# Patient Record
Sex: FEMALE | Race: WHITE | NOT HISPANIC OR LATINO | Employment: PART TIME | URBAN - METROPOLITAN AREA
[De-identification: names, ages, dates, MRNs, and addresses within clinical notes are randomized per-mention and may not be internally consistent; named-entity substitution may affect disease eponyms.]

---

## 2017-04-03 ENCOUNTER — TRANSCRIBE ORDERS (OUTPATIENT)
Dept: ADMINISTRATIVE | Age: 22
End: 2017-04-03

## 2017-04-03 ENCOUNTER — APPOINTMENT (OUTPATIENT)
Dept: LAB | Age: 22
End: 2017-04-03
Attending: PREVENTIVE MEDICINE
Payer: COMMERCIAL

## 2017-04-03 DIAGNOSIS — Z02.1 PRE-EMPLOYMENT HEALTH SCREENING EXAMINATION: ICD-10-CM

## 2017-04-03 DIAGNOSIS — Z02.1 PRE-EMPLOYMENT HEALTH SCREENING EXAMINATION: Primary | ICD-10-CM

## 2017-04-03 PROCEDURE — 86480 TB TEST CELL IMMUN MEASURE: CPT

## 2017-04-03 PROCEDURE — 36415 COLL VENOUS BLD VENIPUNCTURE: CPT

## 2017-04-11 LAB — QUANTIFERON-TB GOLD IN TUBE: NORMAL

## 2018-11-29 ENCOUNTER — OFFICE VISIT (OUTPATIENT)
Dept: CARDIOLOGY CLINIC | Facility: CLINIC | Age: 23
End: 2018-11-29
Payer: COMMERCIAL

## 2018-11-29 ENCOUNTER — TELEPHONE (OUTPATIENT)
Dept: NON INVASIVE DIAGNOSTICS | Facility: HOSPITAL | Age: 23
End: 2018-11-29

## 2018-11-29 VITALS
SYSTOLIC BLOOD PRESSURE: 118 MMHG | HEIGHT: 66 IN | BODY MASS INDEX: 16.99 KG/M2 | DIASTOLIC BLOOD PRESSURE: 72 MMHG | WEIGHT: 105.7 LBS | HEART RATE: 101 BPM

## 2018-11-29 DIAGNOSIS — R00.0 TACHYCARDIA: ICD-10-CM

## 2018-11-29 DIAGNOSIS — G90.9 AUTONOMIC DYSFUNCTION: ICD-10-CM

## 2018-11-29 DIAGNOSIS — R56.9 SEIZURE (HCC): ICD-10-CM

## 2018-11-29 DIAGNOSIS — R55 SYNCOPE, UNSPECIFIED SYNCOPE TYPE: Primary | ICD-10-CM

## 2018-11-29 PROCEDURE — 99245 OFF/OP CONSLTJ NEW/EST HI 55: CPT | Performed by: INTERNAL MEDICINE

## 2018-11-29 PROCEDURE — 93000 ELECTROCARDIOGRAM COMPLETE: CPT | Performed by: INTERNAL MEDICINE

## 2018-11-29 RX ORDER — FOLIC ACID 1 MG/1
2 TABLET ORAL
COMMUNITY
Start: 2018-01-02 | End: 2018-11-29 | Stop reason: ALTCHOICE

## 2018-11-29 RX ORDER — LORAZEPAM 0.5 MG/1
0.5 TABLET ORAL AS NEEDED
COMMUNITY
Start: 2018-11-20

## 2018-11-29 RX ORDER — NORETHINDRONE ACETATE AND ETHINYL ESTRADIOL 1MG-20(21)
1 KIT ORAL
COMMUNITY
Start: 2017-12-26 | End: 2018-11-29 | Stop reason: ALTCHOICE

## 2018-11-29 RX ORDER — NEBIVOLOL 5 MG/1
5 TABLET ORAL
COMMUNITY
End: 2018-12-02

## 2018-11-29 RX ORDER — AZELASTINE HYDROCHLORIDE 137 UG/1
SPRAY, METERED NASAL
COMMUNITY
Start: 2018-08-30 | End: 2018-11-29 | Stop reason: ALTCHOICE

## 2018-11-29 RX ORDER — ALBUTEROL SULFATE 90 UG/1
AEROSOL, METERED RESPIRATORY (INHALATION)
COMMUNITY
Start: 2016-02-26 | End: 2018-11-29 | Stop reason: ALTCHOICE

## 2018-11-29 NOTE — LETTER
December 2, 2018     MD Halina [address]  [address]  [facility] [address]    Patient: Gunner Reeves   YOB: 1995   Date of Visit: 11/29/2018       Dear Dr Mita Buitrago: Thank you for referring Carmela Arango to me for evaluation  Below are my notes for this consultation  If you have questions, please do not hesitate to call me  I look forward to following your patient along with you  Sincerely,        Fauzia Rizvi MD        CC: MD Fauzia Rushing MD  12/2/2018  3:08 PM  Sign at close encounter                                             Cardiology Consultation     Gunner Reeves  [id number]  1995  [address]    1  Syncope, unspecified syncope type  POCT ECG    Tilt table   2  Tachycardia  Tilt table    propranolol (INDERAL) 10 mg tablet   3  Seizure ([facility] [address] )     4   Autonomic dysfunction         History of present illness    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 referred to me by Dr Rl Cartwright for management of  Tachycardia, pre syncope and syncope     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  She is a runner and can runner at 4 6 m/h for 45 min on many days  She is physically active    She has passed out - standing for a prolonged period at rounds, after she has stopped running and cooling down, with orthostatic position change   She does have a history of seizures  She also has anorexia and is working on same , BMI -17    She however informed that she is here for  discussions about a LINQ  She was under impression that it will be in for a month,  and will help solve diagnose symptoms going on for last 6-7 years     She has been on metoprolol and recently on bisoprolol  These have not really helped her   She drinks about 30 oz of water in a day    The patient is not complaining of anginal like chest pain or chest pressure  There is no worsening orthopnea, paroxysmal nocturnal dyspnea  There is no leg swelling    There is history of presyncope or syncope as described above  There is rare history of transient  lightheadedness  When patient has these palpitations, it is associated with exertional intolerance      Patient Active Problem List   Diagnosis    Seizure-like activity ([facility] )    Seizure ([facility] )    Tachycardia    Autonomic dysfunction     Past Medical History:   Diagnosis Date    Seizures ([facility] )      Social History     Social History    Marital status: Single     Spouse name: N/A    Number of children: N/A    Years of education: N/A     Occupational History    Not on file  Social History Main Topics    Smoking status: Never Smoker    Smokeless tobacco: Never Used    Alcohol use Yes    Drug use: Unknown      Comment: unknown    Sexual activity: Not on file     Other Topics Concern    Not on file     Social History Narrative    No narrative on file      History reviewed  No pertinent family history  History reviewed  No pertinent surgical history      Current Outpatient Prescriptions:     LEVETIRACETAM ER PO, Take 1,500 mg by mouth 2 (two) times a day  , Disp: , Rfl:     LORazepam (ATIVAN) 0 5 mg tablet, Take 0 5 mg by mouth as needed  , Disp: , Rfl:     nebivolol (BYSTOLIC) 5 mg tablet, Take 5 mg by mouth Takes 10mg in the morning and 5mg in the evening , Disp: , Rfl:   Allergies   Allergen Reactions    Morphine Anaphylaxis     Stopped breathing (too high a dose suspected)    Iodinated Diagnostic Agents Hives and Rash     Vitals:    11/29/18 0853   BP: 118/72   BP Location: Left arm   Patient Position: Sitting   Cuff Size: Adult   Pulse: 101   Weight: 47 9 kg (105 lb 11 2 oz)   Height: 5' 6" (1 676 m)       Labs:  Lab Results   Component Value Date     08/22/2014    K 3 6 09/25/2016    K 4 2 08/22/2014     (H) 09/25/2016     08/22/2014    CO2 23 09/25/2016    CO2 23 09/25/2016    BUN 7 09/25/2016    BUN 11 08/22/2014    CREATININE 0 60 09/25/2016    CREATININE 0 54 (L) 08/22/2014    GLUCOSE 84 09/25/2016    GLUCOSE 67 08/22/2014    CALCIUM 7 8 (L) 09/25/2016    CALCIUM 9 5 08/22/2014     Lab Results   Component Value Date    CKTOTAL 112 09/25/2016     Lab Results   Component Value Date    WBC 3 07 (L) 09/25/2016    WBC 5 74 08/22/2014    HGB 11 8 09/25/2016    HGB 12 7 08/22/2014    HCT 35 7 09/25/2016    HCT 38 2 08/22/2014    MCV 86 09/25/2016    MCV 86 08/22/2014     09/25/2016     08/22/2014     No results found for: CHOL, TRIG, HDL, LDLDIRECT  Imaging: No results found  Review of Systems:  Review of Systems   All other systems reviewed and are negative  as described in my history of present illness        Physical Exam:  Physical Exam   Constitutional: She is oriented to person, place, and time  No distress  Not in any distress at the current time  Thinly built   HENT:   Head: Normocephalic and atraumatic  Right Ear: External ear normal    Left Ear: External ear normal    Nose: Nose normal    Mouth/Throat: Uvula is midline and mucous membranes are normal    Eyes: Pupils are equal, round, and reactive to light  Conjunctivae, EOM and lids are normal  No scleral icterus  No pallor  No cyanosis  No icterus   Neck: Trachea normal and normal range of motion  Neck supple  No JVD present  Carotid bruit is not present  No thyromegaly present  No jugular lymphadenopathy   Cardiovascular: Normal rate, regular rhythm, S1 normal, S2 normal, normal heart sounds, intact distal pulses and normal pulses  PMI is not displaced  Exam reveals no gallop, no S3, no S4 and no friction rub  No murmur heard  Pulmonary/Chest: Effort normal and breath sounds normal  No accessory muscle usage  No respiratory distress  She has no decreased breath sounds  She has no wheezes  She has no rhonchi  She has no rales  She exhibits no tenderness  Abdominal: Soft   Normal appearance and bowel sounds are normal  She exhibits no distension and no mass  There is no splenomegaly or hepatomegaly  There is no tenderness  Musculoskeletal: Normal range of motion  She exhibits no edema, tenderness or deformity  Lymphadenopathy:     She has no cervical adenopathy  Neurological: She is alert and oriented to person, place, and time  Facial symmetry is retained  Extraocular movements are retained  Head neck tongue and palate movement are retained and symmetric   Skin: Skin is intact  No abrasion, no lesion and no rash noted  No erythema  Nails show no clubbing  Psychiatric: She has a normal mood and affect  Her speech is normal and behavior is normal  Thought content normal        Discussion/Summary:    1   Autonomic dysfunction  Palpitation, pre syncope, syncope     Patient does have symptoms involving multiple systems:  -Palpitations, lightheadedness, syncope on postural changes  -Headaches  -Fatigue and exertional intolerance at times      Important relevant laboratory parameters:  CBCD- normal  TSH- normal - checked outside   Holter - has circadian variation - not IST  Echo - normal  TILT - Not available         Considering her multisystem symptoms, laboratory parameters - suspected diagnosis is  Autonomic dysfunction    The following are my detailed recommendations:  Exercise is the most important component of therapy in functional autonomic disorders     1)  Water intake  Oral 2 1/2 to 3 L a day  This is close to 75 - 90 ounce  Is recommended measure      Intravenous saline should only be used as emergency step to prevent hospital admissions  Done in the emergency room, up to a liter to 1 liters over 1 hour  Symptomatic improvement can last several hours to days  Should be used only as rescue therapy in a clinically decompensated patient  Long-term infusion is never recommended      2) Salt intake  upto 10 grams can be used in appropriate patient   Considering her age, risk of hypertension recommend -half the water be gatorade /powerade      3) Pressure stockings  Bilateral  Knee high, thigh high or waist high  10 mm, 20 mm, 30 mm, 40 mm Hg  Helps with venous return        4) Exercise -  [place] protocol  Set up appointment with Ms Damon Palencia / Ms Rona Clemente  This has both aerobic and muscle training component  Aerobic- will increase vagal tone and control of heart rate  Muscle training of legs- will help with venous return   Will request monthly evaluation of level of exercise tolerance       5) Medications to avoid  Numerous anti depressants and ADHD medications that inhibit NE transporter  TCA  SNRI - duloxetine, venlafaxine, milnacipram  NRI - atomoxetine, reboxetine  These can provoke POTS in susceptible healthy volunteers        6) beta blockers  after exercise has been started, if patient still symptomatic with palpitations  Consider low-dose nonselective beta blocker  Propranolol 10 mg up to 3 times daily  Do not increase dose as it may worsen asthma  Also with diabetes should avoid any high dose of beta blocker  Avoid using selective beta blockers like metoprolol        7) Fludrocortisone  This will help with water retention  The [place] affect lasts only 1-2 days  Effectiveness not really tested with randomized clinical trial      8)Midodrine  This is an alpha agonist, constriction of veins and arteries  It increases venous return and reduces orthostatic tachycardia  Effect is less than intravenous line  Rapid onset but only brief effect  It needs to be taken 3 times daily  Can be administered only during a time to avoid supine hypertension      9) Pyridostigmine  Is a peripheral acetylcholine esterase inhibitor  Can be used as an additive to other medication  Has significant side effects of diarrhea, abdominal pain, nausea, increased urinary urgency and frequency      10) Ivabradine  About 60 percent the patient's of symptomatic improvement in open-label  study 11) other medications-central sympathetic light occasions  Clonidine, methyldopa  Both agents can cause drowsiness, fatigue, worsening mental clouding      12) Modafinil  Has been considered for fatigue and cognitive dysfunction  It worsen symptom of tachycardia          Summary of my recommendations   At the current time my recommendations are   - TILT table study, comprehensive 45 min tilt - can help with diagnosis of vasovagal syncope vs POTS  - water intake + pressure stockings + exercise  - d/c bisoprolol  - start propranolol 10 mg tid  - medications to avoid as in 5  - other medications are not to be considered at this time   - follow up in 2-3 months

## 2018-12-02 PROBLEM — G90.9 AUTONOMIC DYSFUNCTION: Status: ACTIVE | Noted: 2018-12-02

## 2018-12-02 RX ORDER — PROPRANOLOL HYDROCHLORIDE 10 MG/1
10 TABLET ORAL 3 TIMES DAILY
Qty: 90 TABLET | Refills: 5 | OUTPATIENT
Start: 2018-12-02

## 2018-12-02 NOTE — PROGRESS NOTES
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 Dr Neel Taylor for management of  Tachycardia, pre syncope and syncope     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  She is a runner and can runner at 4 6 m/h for 45 min on many days  She is physically active    She has passed out - standing for a prolonged period at rounds, after she has stopped running and cooling down, with orthostatic position change   She does have a history of seizures  She also has anorexia and is working on same , BMI -17    She however informed that she is here for  discussions about a LINQ  She was under impression that it will be in for a month,  and will help solve diagnose symptoms going on for last 6-7 years     She has been on metoprolol and recently on bisoprolol  These have not really helped her   She drinks about 30 oz of water in a day    The patient is not complaining of anginal like chest pain or chest pressure  There is no worsening orthopnea, paroxysmal nocturnal dyspnea  There is no leg swelling    There is history of presyncope or syncope as described above  There is rare history of transient  lightheadedness  When patient has these palpitations, it is associated with exertional intolerance      Patient Active Problem List   Diagnosis    Seizure-like activity ([facility] )    Seizure ([facility] )    Tachycardia    Autonomic dysfunction     Past Medical History:   Diagnosis Date    [facility]ures ([facility] )      Social History     Social History    Marital status: Single Spouse name: N/A    Number of children: N/A    Years of education: N/A     Occupational History    Not on file  Social History Main Topics    Smoking status: Never Smoker    Smokeless tobacco: Never Used    Alcohol use Yes    Drug use: Unknown      Comment: unknown    Sexual activity: Not on file     Other Topics Concern    Not on file     Social History Narrative    No narrative on file      History reviewed  No pertinent family history  History reviewed  No pertinent surgical history      Current Outpatient Prescriptions:     LEVETIRACETAM ER PO, Take 1,500 mg by mouth 2 (two) times a day  , Disp: , Rfl:     LORazepam (ATIVAN) 0 5 mg tablet, Take 0 5 mg by mouth as needed  , Disp: , Rfl:     nebivolol (BYSTOLIC) 5 mg tablet, Take 5 mg by mouth Takes 10mg in the morning and 5mg in the evening , Disp: , Rfl:   Allergies   Allergen Reactions    Morphine Anaphylaxis     Stopped breathing (too high a dose suspected)    Iodinated Diagnostic Agents Hives and Rash     Vitals:    11/29/18 0853   BP: 118/72   BP Location: Left arm   Patient Position: Sitting   Cuff Size: Adult   Pulse: 101   Weight: 47 9 kg (105 lb 11 2 oz)   Height: 5' 6" (1 676 m)       Labs:  Lab Results   Component Value Date     08/22/2014    K 3 6 09/25/2016    K 4 2 08/22/2014     (H) 09/25/2016     08/22/2014    CO2 23 09/25/2016    CO2 23 09/25/2016    BUN 7 09/25/2016    BUN 11 08/22/2014    CREATININE 0 60 09/25/2016    CREATININE 0 54 (L) 08/22/2014    GLUCOSE 84 09/25/2016    GLUCOSE 67 08/22/2014    CALCIUM 7 8 (L) 09/25/2016    CALCIUM 9 5 08/22/2014     Lab Results   Component Value Date    CKTOTAL 112 09/25/2016     Lab Results   Component Value Date    WBC 3 07 (L) 09/25/2016    WBC 5 74 08/22/2014    HGB 11 8 09/25/2016    HGB 12 7 08/22/2014    HCT 35 7 09/25/2016    HCT 38 2 08/22/2014    MCV 86 09/25/2016    MCV 86 08/22/2014     09/25/2016     08/22/2014     No results found for: CHOL, TRIG, HDL, LDLDIRECT  Imaging: No results found  Review of Systems:  Review of Systems   All other systems reviewed and are negative  as described in my history of present illness        Physical Exam:  Physical Exam   Constitutional: She is oriented to person, place, and time  No distress  Not in any distress at the current time  Thinly built   HENT:   Head: Normocephalic and atraumatic  Right Ear: External ear normal    Left Ear: External ear normal    Nose: Nose normal    Mouth/Throat: Uvula is midline and mucous membranes are normal    Eyes: Pupils are equal, round, and reactive to light  Conjunctivae, EOM and lids are normal  No scleral icterus  No pallor  No cyanosis  No icterus   Neck: Trachea normal and normal range of motion  Neck supple  No JVD present  Carotid bruit is not present  No thyromegaly present  No jugular lymphadenopathy   Cardiovascular: Normal rate, regular rhythm, S1 normal, S2 normal, normal heart sounds, intact distal pulses and normal pulses  PMI is not displaced  Exam reveals no gallop, no S3, no S4 and no friction rub  No murmur heard  Pulmonary/Chest: Effort normal and breath sounds normal  No accessory muscle usage  No respiratory distress  She has no decreased breath sounds  She has no wheezes  She has no rhonchi  She has no rales  She exhibits no tenderness  Abdominal: Soft  Normal appearance and bowel sounds are normal  She exhibits no distension and no mass  There is no splenomegaly or hepatomegaly  There is no tenderness  Musculoskeletal: Normal range of motion  She exhibits no edema, tenderness or deformity  Lymphadenopathy:     She has no cervical adenopathy  Neurological: She is alert and oriented to person, place, and time  Facial symmetry is retained  Extraocular movements are retained  Head neck tongue and palate movement are retained and symmetric   Skin: Skin is intact  No abrasion, no lesion and no rash noted  No erythema   Nails show no clubbing  Psychiatric: She has a normal mood and affect  Her speech is normal and behavior is normal  Thought content normal        Discussion/Summary:    1   Autonomic dysfunction  Palpitation, pre syncope, syncope     Patient does have symptoms involving multiple systems:  -Palpitations, lightheadedness, syncope on postural changes  -Headaches  -Fatigue and exertional intolerance at times      Important relevant laboratory parameters:  CBCD- normal  TSH- normal - checked outside   Holter - has circadian variation - not IST  Echo - normal  TILT - Not available         Considering her multisystem symptoms, laboratory parameters - suspected diagnosis is  Autonomic dysfunction    The following are my detailed recommendations:  Exercise is the most important component of therapy in functional autonomic disorders     1)  Water intake  Oral 2 1/2 to 3 L a day  This is close to 75 - 90 ounce  Is recommended measure      Intravenous saline should only be used as emergency step to prevent hospital admissions  Done in the emergency room, up to a liter to 1 liters over 1 hour  Symptomatic improvement can last several hours to days  Should be used only as rescue therapy in a clinically decompensated patient  Long-term infusion is never recommended      2) Salt intake  upto 10 grams can be used in appropriate patient   Considering her age, risk of hypertension recommend -half the water be gatorade /powerade      3) Pressure stockings  Bilateral  Knee high, thigh high or waist high  10 mm, 20 mm, 30 mm, 40 mm Hg  Helps with venous return        4) Exercise -  [facility] protocol  Set up appointment with Ms Louann Bamberger / Ms Jett Hernandez  This has both aerobic and muscle training component  Aerobic- will increase vagal tone and control of heart rate  Muscle training of legs- will help with venous return   Will request monthly evaluation of level of exercise tolerance       5) Medications to avoid  Numerous anti depressants and ADHD medications that inhibit NE transporter  TCA  SNRI - duloxetine, venlafaxine, milnacipram  NRI - atomoxetine, reboxetine  These can provoke POTS in susceptible healthy volunteers        6) beta blockers  after exercise has been started, if patient still symptomatic with palpitations  Consider low-dose nonselective beta blocker  Propranolol 10 mg up to 3 times daily  Do not increase dose as it may worsen asthma  Also with diabetes should avoid any high dose of beta blocker  Avoid using selective beta blockers like metoprolol        7) Fludrocortisone  This will help with water retention  [employer] [place] affect lasts only 1-2 days  Effectiveness not really tested with randomized clinical trial      8)Midodrine  This is an alpha agonist, constriction of veins and arteries  It increases venous return and reduces orthostatic tachycardia  Effect is less than intravenous line  Rapid onset but only brief effect  It needs to be taken 3 times daily  Can be administered only during a time to avoid supine hypertension      9) Pyridostigmine  Is a peripheral acetylcholine esterase inhibitor  Can be used as an additive to other medication  Has significant side effects of diarrhea, abdominal pain, nausea, increased urinary urgency and frequency      10) Ivabradine  About 60 percent the patient's of symptomatic improvement in open-label  study       11) other medications-central sympathetic light occasions  Clonidine, methyldopa  Both agents can cause drowsiness, fatigue, worsening mental clouding      12) Modafinil  Has been considered for fatigue and cognitive dysfunction  It worsen symptom of tachycardia          Summary of my recommendations   At the current time my recommendations are   - TILT table study, comprehensive 45 min tilt - can help with diagnosis of vasovagal syncope vs POTS  - water intake + pressure stockings + exercise  - d/c bisoprolol  - start propranolol 10 mg tid  - medications to avoid as in 5  - other medications are not to be considered at this time   - follow up in 2-3 months

## 2018-12-12 ENCOUNTER — TELEPHONE (OUTPATIENT)
Dept: NON INVASIVE DIAGNOSTICS | Facility: HOSPITAL | Age: 23
End: 2018-12-12

## 2018-12-12 NOTE — TELEPHONE ENCOUNTER
Left vm for patient on 12/11 in attempt to fulfill request to have TILT procedure moved to today 12/12

## 2018-12-18 ENCOUNTER — HOSPITAL ENCOUNTER (OUTPATIENT)
Dept: NON INVASIVE DIAGNOSTICS | Facility: HOSPITAL | Age: 23
Discharge: HOME | End: 2018-12-18
Attending: INTERNAL MEDICINE
Payer: COMMERCIAL

## 2018-12-18 DIAGNOSIS — R55 SYNCOPE, UNSPECIFIED SYNCOPE TYPE: ICD-10-CM

## 2018-12-18 DIAGNOSIS — R00.0 TACHYCARDIA: ICD-10-CM

## 2018-12-18 PROCEDURE — 93660 TILT TABLE EVALUATION: CPT | Performed by: INTERNAL MEDICINE

## 2018-12-18 PROCEDURE — 93660 TILT TABLE EVALUATION: CPT

## 2018-12-19 ENCOUNTER — TELEPHONE (OUTPATIENT)
Dept: CARDIOLOGY CLINIC | Facility: CLINIC | Age: 23
End: 2018-12-19
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
## 2018-12-23 NOTE — TELEPHONE ENCOUNTER
TILT reviewed - no symptoms during TILT    Patients father called and made aware    Dr Jovan Thompson called and updated

## 2019-05-28 ENCOUNTER — HOSPITAL ENCOUNTER (EMERGENCY)
Facility: HOSPITAL | Age: 24
Discharge: HOME | End: 2019-05-28
Attending: EMERGENCY MEDICINE | Admitting: EMERGENCY MEDICINE
Payer: COMMERCIAL

## 2019-05-28 VITALS
RESPIRATION RATE: 18 BRPM | HEART RATE: 99 BPM | SYSTOLIC BLOOD PRESSURE: 126 MMHG | OXYGEN SATURATION: 100 % | DIASTOLIC BLOOD PRESSURE: 80 MMHG | TEMPERATURE: 98.8 F

## 2019-05-28 DIAGNOSIS — R55 VASOVAGAL SYNCOPE: Primary | ICD-10-CM

## 2019-05-28 LAB
ATRIAL RATE: 76 BPM
BILIRUB UR QL STRIP: NEGATIVE
CLARITY UR: CLEAR
COLOR UR: YELLOW
EXT PREG TEST URINE: NEGATIVE
GLUCOSE SERPL-MCNC: 79 MG/DL (ref 65–140)
GLUCOSE UR STRIP-MCNC: NEGATIVE MG/DL
HGB UR QL STRIP.AUTO: NEGATIVE
KETONES UR STRIP-MCNC: ABNORMAL MG/DL
LEUKOCYTE ESTERASE UR QL STRIP: NEGATIVE
NITRITE UR QL STRIP: NEGATIVE
P AXIS: 70 DEGREES
PH UR STRIP.AUTO: 6 [PH] (ref 4.5–8)
PR INTERVAL: 148 MS
PROT UR STRIP-MCNC: NEGATIVE MG/DL
QRS AXIS: 69 DEGREES
QRSD INTERVAL: 86 MS
QT INTERVAL: 360 MS
QTC INTERVAL: 405 MS
SP GR UR STRIP.AUTO: 1.02 (ref 1–1.03)
T WAVE AXIS: 49 DEGREES
UROBILINOGEN UR QL STRIP.AUTO: 0.2 E.U./DL
VENTRICULAR RATE: 76 BPM

## 2019-05-28 PROCEDURE — 93010 ELECTROCARDIOGRAM REPORT: CPT | Performed by: INTERNAL MEDICINE

## 2019-05-28 PROCEDURE — 99284 EMERGENCY DEPT VISIT MOD MDM: CPT

## 2019-05-28 PROCEDURE — 82948 REAGENT STRIP/BLOOD GLUCOSE: CPT

## 2019-05-28 PROCEDURE — 81003 URINALYSIS AUTO W/O SCOPE: CPT

## 2019-05-28 PROCEDURE — 99283 EMERGENCY DEPT VISIT LOW MDM: CPT | Performed by: EMERGENCY MEDICINE

## 2019-05-28 PROCEDURE — 81025 URINE PREGNANCY TEST: CPT | Performed by: EMERGENCY MEDICINE

## 2019-05-28 PROCEDURE — 93005 ELECTROCARDIOGRAM TRACING: CPT

## 2019-09-13 ENCOUNTER — LAB REQUISITION (OUTPATIENT)
Dept: LAB | Facility: HOSPITAL | Age: 24
End: 2019-09-13
Payer: COMMERCIAL

## 2019-09-13 DIAGNOSIS — D64.9 ANEMIA: ICD-10-CM

## 2019-09-13 DIAGNOSIS — R53.83 OTHER FATIGUE: ICD-10-CM

## 2019-09-13 LAB
RETICS # AUTO: NORMAL 10*3/UL (ref 14097–95744)
RETICS # CALC: 0.84 % (ref 0.37–1.87)
TIBC SERPL-MCNC: 367 UG/DL (ref 250–450)

## 2019-09-13 PROCEDURE — 83550 IRON BINDING TEST: CPT | Performed by: NURSE PRACTITIONER

## 2019-09-13 PROCEDURE — 85045 AUTOMATED RETICULOCYTE COUNT: CPT | Performed by: NURSE PRACTITIONER

## 2019-10-22 PROCEDURE — 80307 DRUG TEST PRSMV CHEM ANLYZR: CPT | Performed by: FAMILY MEDICINE

## 2019-10-23 ENCOUNTER — LAB REQUISITION (OUTPATIENT)
Dept: LAB | Facility: HOSPITAL | Age: 24
End: 2019-10-23
Payer: COMMERCIAL

## 2019-10-23 DIAGNOSIS — R40.0 SOMNOLENCE: ICD-10-CM

## 2019-10-23 LAB
AMPHETAMINES SERPL QL SCN: NEGATIVE
BARBITURATES UR QL: NEGATIVE
BENZODIAZ UR QL: NEGATIVE
COCAINE UR QL: NEGATIVE
METHADONE UR QL: NEGATIVE
OPIATES UR QL SCN: NEGATIVE
PCP UR QL: NEGATIVE
THC UR QL: NEGATIVE